# Patient Record
Sex: FEMALE | Race: WHITE | NOT HISPANIC OR LATINO | URBAN - METROPOLITAN AREA
[De-identification: names, ages, dates, MRNs, and addresses within clinical notes are randomized per-mention and may not be internally consistent; named-entity substitution may affect disease eponyms.]

---

## 2020-01-03 PROBLEM — 275978004 SCREENING FOR MALIGNANT NEOPLASM OF COLON: Status: ACTIVE | Noted: 2019-12-06

## 2020-02-14 PROBLEM — 63532004 DIVERTICULA OF INTESTINE: Status: ACTIVE | Noted: 2020-02-14

## 2020-02-14 PROBLEM — 442076002 EARLY SATIETY: Status: ACTIVE | Noted: 2019-12-06

## 2020-02-14 PROBLEM — 16932000 NAUSEA AND VOMITING: Status: ACTIVE | Noted: 2019-12-06

## 2021-10-08 ENCOUNTER — OFFICE VISIT (OUTPATIENT)
Dept: URBAN - METROPOLITAN AREA CLINIC 35 | Facility: CLINIC | Age: 61
End: 2021-10-08

## 2021-10-08 VITALS
DIASTOLIC BLOOD PRESSURE: 78 MMHG | HEIGHT: 65 IN | SYSTOLIC BLOOD PRESSURE: 128 MMHG | BODY MASS INDEX: 27.82 KG/M2 | OXYGEN SATURATION: 98 % | HEART RATE: 68 BPM | WEIGHT: 167 LBS

## 2021-10-08 PROBLEM — 79922009 EPIGASTRIC PAIN: Status: ACTIVE | Noted: 2020-01-03

## 2021-10-08 PROBLEM — 62315008 DIARRHEA: Status: ACTIVE | Noted: 2021-10-08

## 2021-10-08 PROBLEM — 301717006 RIGHT UPPER QUADRANT PAIN: Status: ACTIVE | Noted: 2021-10-08

## 2021-10-08 RX ORDER — INSULIN ASPART 100 [IU]/ML
AS DIRECTED INJECTION, SOLUTION INTRAVENOUS; SUBCUTANEOUS
Status: DISCONTINUED | COMMUNITY

## 2021-10-08 RX ORDER — DICLOFENAC POTASSIUM 50 MG/1
1 TABLET TABLET, FILM COATED ORAL TWICE A DAY
Qty: 60 | Status: ACTIVE | COMMUNITY

## 2021-10-08 RX ORDER — OMEPRAZOLE 20 MG/1
1 CAPSULE CAPSULE, DELAYED RELEASE ORAL ONCE A DAY
Qty: 90 CAPSULE | Status: ACTIVE | COMMUNITY

## 2021-10-08 RX ORDER — SODIUM SULFATE, POTASSIUM SULFATE, MAGNESIUM SULFATE 17.5; 3.13; 1.6 G/ML; G/ML; G/ML
AS DIRECTED SOLUTION, CONCENTRATE ORAL
Qty: 1 KIT | Refills: 0 | Status: DISCONTINUED | COMMUNITY

## 2021-10-08 RX ORDER — LEVOTHYROXINE SODIUM 0.12 MG/1
1 TABLET IN THE MORNING ON AN EMPTY STOMACH TABLET ORAL ONCE A DAY
Qty: 30 | Status: ACTIVE | COMMUNITY

## 2021-10-08 RX ORDER — ELECTROLYTES/DEXTROSE
AS DIRECTED SOLUTION, ORAL ORAL
Status: ACTIVE | COMMUNITY

## 2021-10-08 RX ORDER — RAMIPRIL 2.5 MG/1
1 CAPSULE CAPSULE ORAL ONCE A DAY
Qty: 30 | Status: ACTIVE | COMMUNITY

## 2021-10-08 RX ORDER — TIZANIDINE 2 MG/1
1 TABLET AS NEEDED TABLET ORAL THREE TIMES A DAY
Status: ACTIVE | COMMUNITY

## 2021-10-08 RX ORDER — INSULIN HUMAN 500 [IU]/ML
AS DIRECTED INJECTION, SOLUTION SUBCUTANEOUS
Status: ACTIVE | COMMUNITY

## 2021-10-08 RX ORDER — ROSUVASTATIN CALCIUM 40 MG/1
1 TABLET TABLET, FILM COATED ORAL ONCE A DAY
Qty: 30 | Status: ACTIVE | COMMUNITY

## 2021-10-08 RX ORDER — METOCLOPRAMIDE 10 MG/1
1 TABLET AS NEEDED TABLET ORAL THREE TIMES A DAY
Qty: 9 TABLET | Refills: 0 | Status: DISCONTINUED | COMMUNITY

## 2021-10-08 NOTE — EXAM-MIGRATED EXAMINATIONS
General Examination : Medical assistant was in room.;     GENERAL APPEARANCE: - alert, in no acute distress, well developed, well nourished;   ORAL CAVITY: - mucosa moist;   THROAT: - clear;   NECK/THYROID: - neck supple, full range of motion;   HEART: - no murmurs, regular rate and rhythm, S1, S2 normal;   LUNGS: - clear to auscultation bilaterally, good air movement, no wheezes, rales, rhonchi;   ABDOMEN: - bowel sounds present, no masses palpable, no organomegaly , no rebound tenderness, soft, nontender, nondistended;

## 2021-10-08 NOTE — HPI-MIGRATED HPI
;   ;     Diarrhea : Patient presents for change in bowel habits consult . Pt admits symptoms began a month ago .   Patient admits normally she will have 1 bowel movement a day with normal stools.  She admits she may have 2 episodes a week when she experiences diarrhea . She admits having  4-5 bowel movements on those days with losse and watery stools and black tarry in color .    Denies taking recent antibiotics. Patient denies any recent stools studies . Patient denies blood in stools, melena , or mucus . Patient last colonoscopy was completed with our practice 01/ 2020 revealing one beinign polyp and diverticulosis  .;   Acid Reflux :                      60 year old female patient presents for abdominal pain consult. Patient admits she has been experiencing symptoms for approximately one month . Patient describes symptoms as severe ache causing her to double over .   Patient states symptoms are located in RUQ/ epigastric .  Patient admits symptoms are intermittent . Patient denies nausea or vomiting. Patient denies having any recent imaging.  Patient admits EGD in the past we dont have record of report .   ;

## 2021-10-12 LAB
ABSOLUTE BASOPHILS: 49
ABSOLUTE EOSINOPHILS: 601
ABSOLUTE LYMPHOCYTES: 1862
ABSOLUTE MONOCYTES: 708
ABSOLUTE NEUTROPHILS: 6480
ALBUMIN/GLOBULIN RATIO: 1.8
ALBUMIN: 4.4
ALKALINE PHOSPHATASE: 73
ALT: 28
AST: 30
BASOPHILS: 0.5
BILIRUBIN, TOTAL: 0.8
BUN/CREATININE RATIO: 28
CALCIUM: 9.7
CARBON DIOXIDE: 29
CHLORIDE: 101
CREATININE: 0.93
EGFR AFRICAN AMERICAN: 77
EGFR NON-AFR. AMERICAN: 66
EOSINOPHILS: 6.2
GLOBULIN: 2.5
GLUCOSE: 199
HEMATOCRIT: 36.3
HEMOGLOBIN: 11.9
LYMPHOCYTES: 19.2
MCH: 29.7
MCHC: 32.8
MCV: 90.5
MONOCYTES: 7.3
MPV: 10
NEUTROPHILS: 66.8
PLATELET COUNT: 351
POTASSIUM: 3.9
PROTEIN, TOTAL: 6.9
RDW: 13.7
RED BLOOD CELL COUNT: 4.01
SODIUM: 139
TSH: 1.81
UREA NITROGEN (BUN): 26
WHITE BLOOD CELL COUNT: 9.7

## 2021-10-21 ENCOUNTER — TELEPHONE ENCOUNTER (OUTPATIENT)
Dept: URBAN - METROPOLITAN AREA CLINIC 35 | Facility: CLINIC | Age: 61
End: 2021-10-21

## 2021-10-26 ENCOUNTER — OFFICE VISIT (OUTPATIENT)
Dept: URBAN - METROPOLITAN AREA SURGERY CENTER 8 | Facility: SURGERY CENTER | Age: 61
End: 2021-10-26

## 2021-10-27 ENCOUNTER — WEB ENCOUNTER (OUTPATIENT)
Dept: URBAN - METROPOLITAN AREA CLINIC 35 | Facility: CLINIC | Age: 61
End: 2021-10-27

## 2021-11-12 ENCOUNTER — OFFICE VISIT (OUTPATIENT)
Dept: URBAN - METROPOLITAN AREA CLINIC 35 | Facility: CLINIC | Age: 61
End: 2021-11-12

## 2021-11-12 VITALS
HEART RATE: 69 BPM | HEIGHT: 65 IN | WEIGHT: 167 LBS | SYSTOLIC BLOOD PRESSURE: 122 MMHG | OXYGEN SATURATION: 97 % | DIASTOLIC BLOOD PRESSURE: 80 MMHG | BODY MASS INDEX: 27.82 KG/M2

## 2021-11-12 RX ORDER — ROSUVASTATIN CALCIUM 40 MG/1
1 TABLET TABLET, FILM COATED ORAL ONCE A DAY
Qty: 30 | Status: ACTIVE | COMMUNITY

## 2021-11-12 RX ORDER — INSULIN HUMAN 500 [IU]/ML
AS DIRECTED INJECTION, SOLUTION SUBCUTANEOUS
Status: ACTIVE | COMMUNITY

## 2021-11-12 RX ORDER — DICLOFENAC POTASSIUM 50 MG/1
1 TABLET TABLET, FILM COATED ORAL TWICE A DAY
Qty: 60 | Status: ACTIVE | COMMUNITY

## 2021-11-12 RX ORDER — OMEPRAZOLE 20 MG/1
1 CAPSULE CAPSULE, DELAYED RELEASE ORAL ONCE A DAY
Qty: 90 CAPSULE | Status: ACTIVE | COMMUNITY

## 2021-11-12 RX ORDER — ELECTROLYTES/DEXTROSE
AS DIRECTED SOLUTION, ORAL ORAL
Status: ACTIVE | COMMUNITY

## 2021-11-12 RX ORDER — RAMIPRIL 2.5 MG/1
1 CAPSULE CAPSULE ORAL ONCE A DAY
Qty: 30 | Status: ACTIVE | COMMUNITY

## 2021-11-12 RX ORDER — TIZANIDINE 2 MG/1
1 TABLET AS NEEDED TABLET ORAL THREE TIMES A DAY
Status: ACTIVE | COMMUNITY

## 2021-11-12 RX ORDER — FAMOTIDINE 40 MG/1
1 TABLET AT BEDTIME TABLET, FILM COATED ORAL ONCE A DAY
Qty: 30 | Refills: 2 | OUTPATIENT
Start: 2021-11-12

## 2021-11-12 RX ORDER — LEVOTHYROXINE SODIUM 0.12 MG/1
1 TABLET IN THE MORNING ON AN EMPTY STOMACH TABLET ORAL ONCE A DAY
Qty: 30 | Status: ACTIVE | COMMUNITY

## 2021-11-12 NOTE — HPI-MIGRATED HPI
;   ;   ;     Follow up- EGD : Patient presents today for follow up to his/her EGD. She states her appetite has change. There are day when she has one and other day she does not. Patient admits her reflux symptoms has not subsided. She admits to taking Prilosec 20mg once day for relief. Patient admits to being nausated only in the mornings. She denies vomting, but admits to having some abdominal discomfort. ;   Diarrhea : Patient presents for change in bowel habits consult . Pt admits symptoms began a month ago .   Patient admits normally she will have 1 bowel movement a day with normal stools.  She admits she may have 2 episodes a week when she experiences diarrhea . She admits having  4-5 bowel movements on those days with losse and watery stools and black tarry in color .    Denies taking recent antibiotics. Patient denies any recent stools studies . Patient denies blood in stools, melena , or mucus . Patient last colonoscopy was completed with our practice 01/ 2020 revealing one beinign polyp and diverticulosis  .;   Acid Reflux : Last Visit(10/08/201)60 year old female patient presents for abdominal pain consult. Patient admits she has been experiencing symptoms for approximately one month . Patient describes symptoms as severe ache causing her to double over .   Patient states symptoms are located in RUQ/ epigastric .  Patient admits symptoms are intermittent . Patient denies nausea or vomiting. Patient denies having any recent imaging.  Patient admits EGD in the past we dont have record of report .;

## 2022-02-11 ENCOUNTER — OFFICE VISIT (OUTPATIENT)
Dept: URBAN - METROPOLITAN AREA CLINIC 35 | Facility: CLINIC | Age: 62
End: 2022-02-11